# Patient Record
Sex: MALE | Race: WHITE | NOT HISPANIC OR LATINO | Employment: OTHER | URBAN - NONMETROPOLITAN AREA
[De-identification: names, ages, dates, MRNs, and addresses within clinical notes are randomized per-mention and may not be internally consistent; named-entity substitution may affect disease eponyms.]

---

## 2024-10-05 ENCOUNTER — HOSPITAL ENCOUNTER (EMERGENCY)
Facility: HOSPITAL | Age: 84
Discharge: OTHER INSTITUTION | End: 2024-10-06
Attending: EMERGENCY MEDICINE
Payer: MEDICARE

## 2024-10-05 ENCOUNTER — APPOINTMENT (OUTPATIENT)
Dept: RADIOLOGY | Facility: HOSPITAL | Age: 84
End: 2024-10-05
Payer: MEDICARE

## 2024-10-05 ENCOUNTER — APPOINTMENT (OUTPATIENT)
Dept: CARDIOLOGY | Facility: HOSPITAL | Age: 84
End: 2024-10-05
Payer: MEDICARE

## 2024-10-05 DIAGNOSIS — I50.9 ACUTE ON CHRONIC CONGESTIVE HEART FAILURE, UNSPECIFIED HEART FAILURE TYPE: Primary | ICD-10-CM

## 2024-10-05 DIAGNOSIS — R79.89 ELEVATED TROPONIN: ICD-10-CM

## 2024-10-05 DIAGNOSIS — N30.00 ACUTE CYSTITIS WITHOUT HEMATURIA: ICD-10-CM

## 2024-10-05 DIAGNOSIS — R53.1 GENERALIZED WEAKNESS: ICD-10-CM

## 2024-10-05 LAB
ALBUMIN SERPL BCP-MCNC: 3.6 G/DL (ref 3.4–5)
ALP SERPL-CCNC: 55 U/L (ref 33–136)
ALT SERPL W P-5'-P-CCNC: 20 U/L (ref 10–52)
ANION GAP SERPL CALC-SCNC: 15 MMOL/L (ref 10–20)
APPEARANCE UR: ABNORMAL
AST SERPL W P-5'-P-CCNC: 17 U/L (ref 9–39)
BASOPHILS # BLD AUTO: 0.04 X10*3/UL (ref 0–0.1)
BASOPHILS NFR BLD AUTO: 0.4 %
BILIRUB SERPL-MCNC: 0.8 MG/DL (ref 0–1.2)
BILIRUB UR STRIP.AUTO-MCNC: NEGATIVE MG/DL
BNP SERPL-MCNC: 394 PG/ML (ref 0–99)
BUN SERPL-MCNC: 28 MG/DL (ref 6–23)
CALCIUM SERPL-MCNC: 8.5 MG/DL (ref 8.6–10.3)
CARDIAC TROPONIN I PNL SERPL HS: 58 NG/L (ref 0–20)
CARDIAC TROPONIN I PNL SERPL HS: 63 NG/L (ref 0–20)
CHLORIDE SERPL-SCNC: 102 MMOL/L (ref 98–107)
CO2 SERPL-SCNC: 28 MMOL/L (ref 21–32)
COLOR UR: ABNORMAL
CREAT SERPL-MCNC: 1.34 MG/DL (ref 0.5–1.3)
EGFRCR SERPLBLD CKD-EPI 2021: 53 ML/MIN/1.73M*2
EOSINOPHIL # BLD AUTO: 0.05 X10*3/UL (ref 0–0.4)
EOSINOPHIL NFR BLD AUTO: 0.5 %
ERYTHROCYTE [DISTWIDTH] IN BLOOD BY AUTOMATED COUNT: 14.1 % (ref 11.5–14.5)
FLUAV RNA RESP QL NAA+PROBE: NOT DETECTED
FLUBV RNA RESP QL NAA+PROBE: NOT DETECTED
GLUCOSE SERPL-MCNC: 123 MG/DL (ref 74–99)
GLUCOSE UR STRIP.AUTO-MCNC: ABNORMAL MG/DL
HCT VFR BLD AUTO: 44.3 % (ref 41–52)
HGB BLD-MCNC: 14.2 G/DL (ref 13.5–17.5)
HOLD SPECIMEN: NORMAL
IMM GRANULOCYTES # BLD AUTO: 0.02 X10*3/UL (ref 0–0.5)
IMM GRANULOCYTES NFR BLD AUTO: 0.2 % (ref 0–0.9)
INR PPP: 1.8 (ref 0.9–1.1)
KETONES UR STRIP.AUTO-MCNC: NEGATIVE MG/DL
LACTATE SERPL-SCNC: 1.5 MMOL/L (ref 0.4–2)
LACTATE SERPL-SCNC: 3.7 MMOL/L (ref 0.4–2)
LEUKOCYTE ESTERASE UR QL STRIP.AUTO: ABNORMAL
LYMPHOCYTES # BLD AUTO: 1.48 X10*3/UL (ref 0.8–3)
LYMPHOCYTES NFR BLD AUTO: 15.5 %
MAGNESIUM SERPL-MCNC: 1.81 MG/DL (ref 1.6–2.4)
MCH RBC QN AUTO: 31 PG (ref 26–34)
MCHC RBC AUTO-ENTMCNC: 32.1 G/DL (ref 32–36)
MCV RBC AUTO: 97 FL (ref 80–100)
MONOCYTES # BLD AUTO: 1.05 X10*3/UL (ref 0.05–0.8)
MONOCYTES NFR BLD AUTO: 11 %
NEUTROPHILS # BLD AUTO: 6.88 X10*3/UL (ref 1.6–5.5)
NEUTROPHILS NFR BLD AUTO: 72.4 %
NITRITE UR QL STRIP.AUTO: NEGATIVE
NRBC BLD-RTO: 0 /100 WBCS (ref 0–0)
PH UR STRIP.AUTO: 5 [PH]
PLATELET # BLD AUTO: 182 X10*3/UL (ref 150–450)
POTASSIUM SERPL-SCNC: 4 MMOL/L (ref 3.5–5.3)
PROT SERPL-MCNC: 6.7 G/DL (ref 6.4–8.2)
PROT UR STRIP.AUTO-MCNC: ABNORMAL MG/DL
PROTHROMBIN TIME: 19.9 SECONDS (ref 9.8–12.8)
RBC # BLD AUTO: 4.58 X10*6/UL (ref 4.5–5.9)
RBC # UR STRIP.AUTO: ABNORMAL /UL
RBC #/AREA URNS AUTO: >20 /HPF
SARS-COV-2 RNA RESP QL NAA+PROBE: NOT DETECTED
SODIUM SERPL-SCNC: 141 MMOL/L (ref 136–145)
SP GR UR STRIP.AUTO: 1.02
SQUAMOUS #/AREA URNS AUTO: ABNORMAL /HPF
UROBILINOGEN UR STRIP.AUTO-MCNC: 4 MG/DL
WBC # BLD AUTO: 9.5 X10*3/UL (ref 4.4–11.3)
WBC #/AREA URNS AUTO: ABNORMAL /HPF

## 2024-10-05 PROCEDURE — 99285 EMERGENCY DEPT VISIT HI MDM: CPT | Mod: 25

## 2024-10-05 PROCEDURE — 70450 CT HEAD/BRAIN W/O DYE: CPT

## 2024-10-05 PROCEDURE — 36415 COLL VENOUS BLD VENIPUNCTURE: CPT | Performed by: EMERGENCY MEDICINE

## 2024-10-05 PROCEDURE — 85610 PROTHROMBIN TIME: CPT | Performed by: EMERGENCY MEDICINE

## 2024-10-05 PROCEDURE — 83880 ASSAY OF NATRIURETIC PEPTIDE: CPT | Performed by: EMERGENCY MEDICINE

## 2024-10-05 PROCEDURE — 96375 TX/PRO/DX INJ NEW DRUG ADDON: CPT

## 2024-10-05 PROCEDURE — 71045 X-RAY EXAM CHEST 1 VIEW: CPT

## 2024-10-05 PROCEDURE — 2500000004 HC RX 250 GENERAL PHARMACY W/ HCPCS (ALT 636 FOR OP/ED)

## 2024-10-05 PROCEDURE — 2500000001 HC RX 250 WO HCPCS SELF ADMINISTERED DRUGS (ALT 637 FOR MEDICARE OP): Performed by: EMERGENCY MEDICINE

## 2024-10-05 PROCEDURE — 2500000004 HC RX 250 GENERAL PHARMACY W/ HCPCS (ALT 636 FOR OP/ED): Performed by: EMERGENCY MEDICINE

## 2024-10-05 PROCEDURE — 87636 SARSCOV2 & INF A&B AMP PRB: CPT | Performed by: EMERGENCY MEDICINE

## 2024-10-05 PROCEDURE — 84484 ASSAY OF TROPONIN QUANT: CPT | Performed by: EMERGENCY MEDICINE

## 2024-10-05 PROCEDURE — 71045 X-RAY EXAM CHEST 1 VIEW: CPT | Performed by: RADIOLOGY

## 2024-10-05 PROCEDURE — 85025 COMPLETE CBC W/AUTO DIFF WBC: CPT | Performed by: EMERGENCY MEDICINE

## 2024-10-05 PROCEDURE — P9612 CATHETERIZE FOR URINE SPEC: HCPCS

## 2024-10-05 PROCEDURE — 93005 ELECTROCARDIOGRAM TRACING: CPT

## 2024-10-05 PROCEDURE — 87186 SC STD MICRODIL/AGAR DIL: CPT | Mod: CONLAB | Performed by: EMERGENCY MEDICINE

## 2024-10-05 PROCEDURE — 81001 URINALYSIS AUTO W/SCOPE: CPT | Performed by: EMERGENCY MEDICINE

## 2024-10-05 PROCEDURE — 70450 CT HEAD/BRAIN W/O DYE: CPT | Performed by: RADIOLOGY

## 2024-10-05 PROCEDURE — 80053 COMPREHEN METABOLIC PANEL: CPT | Performed by: EMERGENCY MEDICINE

## 2024-10-05 PROCEDURE — 96365 THER/PROPH/DIAG IV INF INIT: CPT

## 2024-10-05 PROCEDURE — 83605 ASSAY OF LACTIC ACID: CPT | Performed by: EMERGENCY MEDICINE

## 2024-10-05 PROCEDURE — 83735 ASSAY OF MAGNESIUM: CPT | Performed by: EMERGENCY MEDICINE

## 2024-10-05 RX ORDER — PREGABALIN 150 MG/1
1 CAPSULE ORAL 3 TIMES DAILY
COMMUNITY
Start: 2019-06-05

## 2024-10-05 RX ORDER — NAPROXEN SODIUM 220 MG/1
81 TABLET, FILM COATED ORAL ONCE
Status: COMPLETED | OUTPATIENT
Start: 2024-10-05 | End: 2024-10-05

## 2024-10-05 RX ORDER — APIXABAN 5 MG/1
1 TABLET, FILM COATED ORAL 2 TIMES DAILY
COMMUNITY
Start: 2024-05-29

## 2024-10-05 RX ORDER — AMIODARONE HYDROCHLORIDE 200 MG/1
1 TABLET ORAL DAILY
COMMUNITY
Start: 2024-08-19

## 2024-10-05 RX ORDER — FLUTICASONE PROPIONATE 50 MCG
2 SPRAY, SUSPENSION (ML) NASAL DAILY
COMMUNITY
Start: 2024-05-29

## 2024-10-05 RX ORDER — FAMOTIDINE 20 MG/1
20 TABLET, FILM COATED ORAL DAILY
COMMUNITY

## 2024-10-05 RX ORDER — FERROUS SULFATE 325(65) MG
325 TABLET ORAL
COMMUNITY

## 2024-10-05 RX ORDER — SPIRONOLACTONE 25 MG/1
0.5 TABLET ORAL DAILY
COMMUNITY
Start: 2023-08-07

## 2024-10-05 RX ORDER — LORAZEPAM 0.5 MG/1
0.5 TABLET ORAL ONCE
Status: DISCONTINUED | OUTPATIENT
Start: 2024-10-05 | End: 2024-10-06 | Stop reason: HOSPADM

## 2024-10-05 RX ORDER — NAPROXEN SODIUM 220 MG/1
81 TABLET, FILM COATED ORAL DAILY
COMMUNITY
Start: 2019-06-05

## 2024-10-05 RX ORDER — SACUBITRIL AND VALSARTAN 49; 51 MG/1; MG/1
1 TABLET, FILM COATED ORAL 2 TIMES DAILY
COMMUNITY
Start: 2024-08-19

## 2024-10-05 RX ORDER — METOPROLOL SUCCINATE 50 MG/1
1 TABLET, EXTENDED RELEASE ORAL 2 TIMES DAILY
COMMUNITY
Start: 2024-08-19

## 2024-10-05 RX ORDER — METHYLPREDNISOLONE 4 MG
1 TABLET, DOSE PACK ORAL ONCE
COMMUNITY

## 2024-10-05 RX ORDER — PAROXETINE HYDROCHLORIDE 20 MG/1
1 TABLET, FILM COATED ORAL DAILY
COMMUNITY
Start: 2024-05-29

## 2024-10-05 RX ORDER — TRAMADOL HYDROCHLORIDE 50 MG/1
50 TABLET ORAL EVERY 6 HOURS PRN
COMMUNITY
Start: 2024-05-30

## 2024-10-05 RX ORDER — CEFTRIAXONE 2 G/50ML
2 INJECTION, SOLUTION INTRAVENOUS ONCE
Status: COMPLETED | OUTPATIENT
Start: 2024-10-05 | End: 2024-10-05

## 2024-10-05 RX ORDER — ALLOPURINOL 100 MG/1
2 TABLET ORAL DAILY
COMMUNITY
Start: 2024-04-11

## 2024-10-05 RX ORDER — FUROSEMIDE 10 MG/ML
40 INJECTION INTRAMUSCULAR; INTRAVENOUS ONCE
Status: COMPLETED | OUTPATIENT
Start: 2024-10-05 | End: 2024-10-05

## 2024-10-05 RX ORDER — OMEPRAZOLE 40 MG/1
40 CAPSULE, DELAYED RELEASE ORAL
COMMUNITY
Start: 2024-10-02

## 2024-10-05 RX ORDER — NITROGLYCERIN 0.4 MG/1
0.4 TABLET SUBLINGUAL EVERY 5 MIN PRN
COMMUNITY
Start: 2024-06-25

## 2024-10-05 RX ORDER — COLCHICINE 0.6 MG/1
1 TABLET ORAL 2 TIMES DAILY
COMMUNITY
Start: 2023-08-10

## 2024-10-05 RX ORDER — BUPROPION HYDROCHLORIDE 150 MG/1
1 TABLET ORAL DAILY
COMMUNITY
Start: 2019-06-05

## 2024-10-05 RX ORDER — ROSUVASTATIN CALCIUM 20 MG/1
1 TABLET, COATED ORAL DAILY
COMMUNITY
Start: 2019-06-05

## 2024-10-05 RX ORDER — POTASSIUM CHLORIDE 750 MG/1
10 TABLET, FILM COATED, EXTENDED RELEASE ORAL 2 TIMES DAILY
COMMUNITY

## 2024-10-05 RX ORDER — CEFTRIAXONE 2 G/50ML
INJECTION, SOLUTION INTRAVENOUS
Status: COMPLETED
Start: 2024-10-05 | End: 2024-10-05

## 2024-10-05 ASSESSMENT — PAIN DESCRIPTION - LOCATION: LOCATION: BACK

## 2024-10-05 ASSESSMENT — PAIN DESCRIPTION - DESCRIPTORS: DESCRIPTORS: ACHING

## 2024-10-05 ASSESSMENT — PAIN DESCRIPTION - PAIN TYPE: TYPE: ACUTE PAIN

## 2024-10-05 ASSESSMENT — COLUMBIA-SUICIDE SEVERITY RATING SCALE - C-SSRS
6. HAVE YOU EVER DONE ANYTHING, STARTED TO DO ANYTHING, OR PREPARED TO DO ANYTHING TO END YOUR LIFE?: NO
1. IN THE PAST MONTH, HAVE YOU WISHED YOU WERE DEAD OR WISHED YOU COULD GO TO SLEEP AND NOT WAKE UP?: NO
2. HAVE YOU ACTUALLY HAD ANY THOUGHTS OF KILLING YOURSELF?: NO

## 2024-10-05 ASSESSMENT — PAIN - FUNCTIONAL ASSESSMENT: PAIN_FUNCTIONAL_ASSESSMENT: 0-10

## 2024-10-05 ASSESSMENT — PAIN SCALES - GENERAL
PAINLEVEL_OUTOF10: 0 - NO PAIN
PAINLEVEL_OUTOF10: 7

## 2024-10-05 NOTE — ED TRIAGE NOTES
Presented with c/o SOB and fall. Began getting SOB yesterday. Pt had a fall on Thursday at a urologist office in [place] while getting off of exam table after having a winslow catheter removed. Pt sent to [facility] and worked up for the fall. All results were negative. Pt had urolift procedure recently and winslow catheter placed twice since. Pt is having some incontinence when not being able to get to bathroom in time.

## 2024-10-05 NOTE — ED PROVIDER NOTES
Department of Emergency Medicine   ED  Provider Note  Admit Date/RoomTime: 10/5/2024  2:31 PM  ED Room: RAQUEL/RAQUEL                  History of Present Illness:   Jonny Vicente is a 83 y.o. male presenting to the ED for cough, sob, fever, beginning over last 2 days.  The complaint has been persistent, moderate in severity, and worsened by  walking/laying flat .  Cough minimally productive of phlegm.  Low grade fever today.  States he fell his doctor's office 2 days ago.  He was seen at [facility] ED and had multiple CTs and x-rays all of which were negative.  He is on Eliquis.  No known ill contacts.  He has had some leg swelling.  Shortness of breath is worse with walking and laying flat.      Review of Systems:   Pertinent positives and review of systems as noted above.  Remaining 10 review of systems is negative or noncontributory to today's episode of care.  Review of Systems   A complete review of systems is otherwise negative except as noted above    --------------------------------------------- PAST HISTORY ---------------------------------------------  Past Medical History:  has no past medical history on file.  Please see EMR    Past Surgical History:  has no past surgical history on file.    Social History:  reports that he has quit smoking. His smoking use included cigarettes. He has never used smokeless tobacco. He reports that he does not drink alcohol and does not use drugs.    Family History: family history is not on file. Unless otherwise noted, family history is non contributory    Discharge Medication List as of 10/6/2024 10:36 AM        CONTINUE these medications which have NOT CHANGED    Details   allopurinol (Zyloprim) 100 mg tablet Take 2 tablets (200 mg) by mouth once daily., Starting Thu 4/11/2024, Historical Med      amiodarone (Pacerone) 200 mg tablet Take 1 tablet (200 mg) by mouth once daily., Starting Mon 8/19/2024, Historical Med      aspirin 81 mg chewable tablet Chew 1 tablet (81 mg) once  daily., Starting Wed 6/5/2019, Historical Med      buPROPion XL (Wellbutrin XL) 150 mg 24 hr tablet Take 1 tablet (150 mg) by mouth once daily., Starting Wed 6/5/2019, Historical Med      colchicine 0.6 mg tablet Take 1 tablet (0.6 mg) by mouth 2 times a day., Starting Thu 8/10/2023, Historical Med      Eliquis 5 mg tablet Take 1 tablet (5 mg) by mouth 2 times a day., Starting Wed 5/29/2024, Historical Med      empagliflozin (Jardiance) 25 mg Take 1 tablet (25 mg) by mouth once daily., Starting Tue 6/25/2024, Historical Med      Entresto 49-51 mg tablet Take 1 tablet by mouth 2 times a day., Starting Mon 8/19/2024, Historical Med      fluticasone (Flonase) 50 mcg/actuation nasal spray Administer 2 sprays into affected nostril(s) once daily., Starting Wed 5/29/2024, Historical Med      ipratropium-albuteroL (Combivent Respimat)  mcg/actuation inhaler Inhale 1 puff 4 times a day., Starting Wed 6/5/2019, Historical Med      metoprolol succinate XL (Toprol-XL) 50 mg 24 hr tablet Take 1 tablet (50 mg) by mouth 2 times a day., Starting Mon 8/19/2024, Historical Med      nitroglycerin (Nitrostat) 0.4 mg SL tablet Take 1 tablet (0.4 mg) by mouth every 5 minutes if needed., Starting Tue 6/25/2024, Historical Med      omeprazole (PriLOSEC) 40 mg DR capsule Take 1 capsule (40 mg) by mouth once daily in the morning. Take before meals., Starting Wed 10/2/2024, Historical Med      PARoxetine (Paxil) 20 mg tablet Take 1 tablet (20 mg) by mouth once daily., Starting Wed 5/29/2024, Historical Med      pregabalin (Lyrica) 150 mg capsule Take 1 capsule (150 mg) by mouth 3 times a day., Starting Wed 6/5/2019, Historical Med      rosuvastatin (Crestor) 20 mg tablet Take 1 tablet (20 mg) by mouth once daily., Starting Wed 6/5/2019, Historical Med      spironolactone (Aldactone) 25 mg tablet Take 0.5 tablets (12.5 mg) by mouth once daily., Starting Mon 8/7/2023, Historical Med      traMADol (Ultram) 50 mg tablet Take 1 tablet (50  mg) by mouth every 6 hours if needed for moderate pain (4 - 6)., Starting Thu 5/30/2024, Historical Med      calcium carb-vitamin D3-vit K2 600 mg-1,000 unit-90 mcg tablet Take 1 tablet by mouth once daily., Historical Med      famotidine (Pepcid) 20 mg tablet Take 1 tablet (20 mg) by mouth once daily., Historical Med      ferrous sulfate, 325 mg ferrous sulfate, tablet Take 1 tablet (325 mg) by mouth once daily., Historical Med      glucosamine sulfate 500 mg tablet Take 1 tablet by mouth 1 time., Historical Med      potassium chloride CR 10 mEq ER tablet Take 1 tablet (10 mEq) by mouth 2 times a day., Historical Med            The patient’s home medications have been reviewed.    Allergies: Hydrocodone and Oxycodone    -------------------------------------------------- RESULTS -------------------------------------------------  All laboratory and radiology results have been personally reviewed by myself   LABS:  Labs Reviewed   CBC WITH AUTO DIFFERENTIAL - Abnormal       Result Value    WBC 9.5      nRBC 0.0      RBC 4.58      Hemoglobin 14.2      Hematocrit 44.3      MCV 97      MCH 31.0      MCHC 32.1      RDW 14.1      Platelets 182      Neutrophils % 72.4      Immature Granulocytes %, Automated 0.2      Lymphocytes % 15.5      Monocytes % 11.0      Eosinophils % 0.5      Basophils % 0.4      Neutrophils Absolute 6.88 (*)     Immature Granulocytes Absolute, Automated 0.02      Lymphocytes Absolute 1.48      Monocytes Absolute 1.05 (*)     Eosinophils Absolute 0.05      Basophils Absolute 0.04     COMPREHENSIVE METABOLIC PANEL - Abnormal    Glucose 123 (*)     Sodium 141      Potassium 4.0      Chloride 102      Bicarbonate 28      Anion Gap 15      Urea Nitrogen 28 (*)     Creatinine 1.34 (*)     eGFR 53 (*)     Calcium 8.5 (*)     Albumin 3.6      Alkaline Phosphatase 55      Total Protein 6.7      AST 17      Bilirubin, Total 0.8      ALT 20     PROTIME-INR - Abnormal    Protime 19.9 (*)     INR 1.8 (*)     B-TYPE NATRIURETIC PEPTIDE - Abnormal     (*)     Narrative:        <100 pg/mL - Heart failure unlikely  100-299 pg/mL - Intermediate probability of acute heart                  failure exacerbation. Correlate with clinical                  context and patient history.    >=300 pg/mL - Heart Failure likely. Correlate with clinical                  context and patient history.    BNP testing is performed using different testing methodology at [facility] than at other [facility]. Direct result comparisons should only be made within the same method.      LACTATE - Abnormal    Lactate 3.7 (*)     Narrative:     Venipuncture immediately after or during the administration of Metamizole may lead to falsely low results. Testing should be performed immediately prior to Metamizole dosing.   SERIAL TROPONIN-INITIAL - Abnormal    Troponin I, High Sensitivity 63 (*)     Narrative:     Less than 99th percentile of normal range cutoff-  Female and children under 18 years old <14 ng/L; Male <21 ng/L: Negative  Repeat testing should be performed if clinically indicated.     Female and children under 18 years old 14-50 ng/L; Male 21-50 ng/L:  Consistent with possible cardiac damage and possible increased clinical   risk. Serial measurements may help to assess extent of myocardial damage.     >50 ng/L: Consistent with cardiac damage, increased clinical risk and  myocardial infarction. Serial measurements may help assess extent of   myocardial damage.      NOTE: Children less than 1 year old may have higher baseline troponin   levels and results should be interpreted in conjunction with the overall   clinical context.     NOTE: Troponin I testing is performed using a different   testing methodology at [facility] than at other   [facility]. Direct result comparisons should only   be made within the same method.   SERIAL TROPONIN, 1 HOUR - Abnormal    Troponin I, High Sensitivity 58 (*)      Narrative:     Less than 99th percentile of normal range cutoff-  Female and children under 18 years old <14 ng/L; Male <21 ng/L: Negative  Repeat testing should be performed if clinically indicated.     Female and children under 18 years old 14-50 ng/L; Male 21-50 ng/L:  Consistent with possible cardiac damage and possible increased clinical   risk. Serial measurements may help to assess extent of myocardial damage.     >50 ng/L: Consistent with cardiac damage, increased clinical risk and  myocardial infarction. Serial measurements may help assess extent of   myocardial damage.      NOTE: Children less than 1 year old may have higher baseline troponin   levels and results should be interpreted in conjunction with the overall   clinical context.     NOTE: Troponin I testing is performed using a different   testing methodology at [facility] than at other   [facility]. Direct result comparisons should only   be made within the same method.   URINALYSIS WITH REFLEX CULTURE AND MICROSCOPIC - Abnormal    Color, Urine Isidra (*)     Appearance, Urine Hazy (*)     Specific Gravity, Urine 1.017      pH, Urine 5.0      Protein, Urine 30 (1+) (*)     Glucose, Urine >=500 (3+) (*)     Blood, Urine LARGE (3+) (*)     Ketones, Urine NEGATIVE      Bilirubin, Urine NEGATIVE      Urobilinogen, Urine 4.0 (*)     Nitrite, Urine NEGATIVE      Leukocyte Esterase, Urine SMALL (1+) (*)    MICROSCOPIC ONLY, URINE - Abnormal    WBC, Urine 11-20 (*)     RBC, Urine >20 (*)     Squamous Epithelial Cells, Urine 1-9 (SPARSE)     CBC WITH AUTO DIFFERENTIAL - Abnormal    WBC 8.9      nRBC 0.0      RBC 4.21 (*)     Hemoglobin 12.9 (*)     Hematocrit 40.0 (*)     MCV 95      MCH 30.6      MCHC 32.3      RDW 14.0      Platelets 175      Neutrophils % 72.5      Immature Granulocytes %, Automated 0.3      Lymphocytes % 15.4      Monocytes % 9.9      Eosinophils % 1.5      Basophils % 0.4      Neutrophils Absolute 6.47 (*)      Immature Granulocytes Absolute, Automated 0.03      Lymphocytes Absolute 1.37      Monocytes Absolute 0.88 (*)     Eosinophils Absolute 0.13      Basophils Absolute 0.04     BASIC METABOLIC PANEL - Abnormal    Glucose 94      Sodium 137      Potassium 3.2 (*)     Chloride 99      Bicarbonate 30      Anion Gap 11      Urea Nitrogen 31 (*)     Creatinine 1.22      eGFR 60 (*)     Calcium 8.2 (*)    TROPONIN I, HIGH SENSITIVITY - Abnormal    Troponin I, High Sensitivity 44 (*)     Narrative:     Less than 99th percentile of normal range cutoff-  Female and children under 18 years old <14 ng/L; Male <21 ng/L: Negative  Repeat testing should be performed if clinically indicated.     Female and children under 18 years old 14-50 ng/L; Male 21-50 ng/L:  Consistent with possible cardiac damage and possible increased clinical   risk. Serial measurements may help to assess extent of myocardial damage.     >50 ng/L: Consistent with cardiac damage, increased clinical risk and  myocardial infarction. Serial measurements may help assess extent of   myocardial damage.      NOTE: Children less than 1 year old may have higher baseline troponin   levels and results should be interpreted in conjunction with the overall   clinical context.     NOTE: Troponin I testing is performed using a different   testing methodology at [facility] than at other   [facility]. Direct result comparisons should only   be made within the same method.   B-TYPE NATRIURETIC PEPTIDE - Abnormal     (*)     Narrative:        <100 pg/mL - Heart failure unlikely  100-299 pg/mL - Intermediate probability of acute heart                  failure exacerbation. Correlate with clinical                  context and patient history.    >=300 pg/mL - Heart Failure likely. Correlate with clinical                  context and patient history.    BNP testing is performed using different testing methodology at [facility] than at other  [facility]. Direct result comparisons should only be made within the same method.      MAGNESIUM - Normal    Magnesium 1.81     SARS-COV-2 PCR - Normal    Coronavirus 2019, PCR Not Detected      Narrative:     This assay has received FDA Emergency Use Authorization (EUA) and is only authorized for the duration of time that circumstances exist to justify the authorization of the emergency use of in vitro diagnostic tests for the detection of SARS-CoV-2 virus and/or diagnosis of COVID-19 infection under section 564(b)(1) of the Act, 21 U.S.C. 360bbb-3(b)(1). This assay is an in vitro diagnostic nucleic acid amplification test for the qualitative detection of SARS-CoV-2 from nasopharyngeal specimens and has been validated for use at [facility]. Negative results do not preclude COVID-19 infections and should not be used as the sole basis for diagnosis, treatment, or other management decisions.     INFLUENZA A AND B PCR - Normal    Flu A Result Not Detected      Flu B Result Not Detected      Narrative:     This assay is an in vitro diagnostic multiplex nucleic acid amplification test for the detection and discrimination of Influenza A & B from nasopharyngeal specimens, and has been validated for use at [facility]. Negative results do not preclude Influenza A/B infections, and should not be used as the sole basis for diagnosis, treatment, or other management decisions. If Influenza A/B and RSV PCR results are negative, testing for Parainfluenza virus, Adenovirus and Metapneumovirus is routinely performed for [facility] pediatric oncology and intensive care inpatients, and is available on other patients by placing an add-on request.   LACTATE - Normal    Lactate 1.5      Narrative:     Venipuncture immediately after or during the administration of Metamizole may lead to falsely low results. Testing should be performed immediately prior to Metamizole dosing.   URINE CULTURE    TROPONIN SERIES- (INITIAL, 1 HR)    Narrative:     The following orders were created for panel order Troponin I Series, High Sensitivity (0, 1 HR).  Procedure                               Abnormality         Status                     ---------                               -----------         ------                     Troponin I, High Sensiti...[[id number]]  Abnormal            Final result               Troponin, High Sensitivi...[[id number]]  Abnormal            Final result                 Please view results for these tests on the individual orders.   URINALYSIS WITH REFLEX CULTURE AND MICROSCOPIC    Narrative:     The following orders were created for panel order Urinalysis with Reflex Culture and Microscopic.  Procedure                               Abnormality         Status                     ---------                               -----------         ------                     Urinalysis with Reflex C...[[id number]]  Abnormal            Final result               Extra Urine Gray Tube[[id number]]                            Final result                 Please view results for these tests on the individual orders.   EXTRA URINE GRAY TUBE    Extra Tube Hold for add-ons.           RADIOLOGY:  Interpreted by Radiologist.  CT head wo IV contrast   Final Result   No acute intracranial hemorrhage or large territory infarction is   evident.        Signed by: Bill Feliciano 10/5/2024 3:54 PM   Dictation workstation:   [id number]      XR chest 1 view   Final Result   No acute cardiopulmonary process is evident.        MACRO:   None        Signed by: Bill Feliciano 10/5/2024 3:00 PM   Dictation workstation:   [id number]          No results found for this or any previous visit (from the past 4464 hour(s)).  ------------------------- NURSING NOTES AND VITALS REVIEWED ---------------------------   The nursing notes within the ED encounter and vital signs as below have been reviewed.   /78   Pulse 74   Temp  "36.3 °C (97.4 °F)   Resp 18   Ht 1.778 m (5' 10\")   Wt (!) 157 kg (346 lb 2 oz)   SpO2 98%   BMI 49.66 kg/m²   Oxygen Saturation Interpretation: Normal      ---------------------------------------------------PHYSICAL EXAM--------------------------------------  Physical Exam  Vitals and nursing note reviewed.   Constitutional:       General: He is not in acute distress.     Appearance: He is well-developed.   HENT:      Head: Normocephalic and atraumatic.      Mouth/Throat:      Mouth: Mucous membranes are moist.      Pharynx: Oropharynx is clear.   Eyes:      Extraocular Movements: Extraocular movements intact.      Conjunctiva/sclera: Conjunctivae normal.      Pupils: Pupils are equal, round, and reactive to light.   Neck:      Thyroid: No thyromegaly.      Vascular: No JVD.   Cardiovascular:      Rate and Rhythm: Normal rate and regular rhythm.      Pulses: Normal pulses.      Heart sounds: Normal heart sounds. No murmur heard.  Pulmonary:      Effort: Pulmonary effort is normal. No respiratory distress.      Breath sounds: Examination of the right-lower field reveals rales. Examination of the left-lower field reveals rales. Decreased breath sounds and rales present. No wheezing or rhonchi.   Chest:      Chest wall: No mass, deformity, tenderness, crepitus or edema. There is no dullness to percussion.   Abdominal:      Palpations: Abdomen is soft. There is no hepatomegaly or mass.      Tenderness: There is no abdominal tenderness. There is no guarding or rebound.   Musculoskeletal:         General: No swelling. Normal range of motion.      Cervical back: Normal range of motion and neck supple.      Right lower leg: No tenderness. Edema present.      Left lower leg: No tenderness. Edema present.   Lymphadenopathy:      Cervical: No cervical adenopathy.   Skin:     General: Skin is warm and dry.      Capillary Refill: Capillary refill takes less than 2 seconds.      Coloration: Skin is not cyanotic.      " Findings: No rash.      Nails: There is no clubbing.   Neurological:      Mental Status: He is alert and oriented to person, place, and time.   Psychiatric:         Mood and Affect: Mood normal.            Procedures  NONE  ------------------------------ ED COURSE/MEDICAL DECISION MAKING----------------------    Medical Decision Making:   Patient was seen and examined by me.  An IV is started.  Multiple labs ordered.  Chest x-ray imaging ordered.  Viral testing ordered.    ED Course as of 10/06/24 1712   Sat Oct 05, 2024   1526 White blood cell count is 9.5, hemoglobin 14.2, hematocrit 44.3, platelet count 118,000, no significant shift [EC]   1526 EKG at 1437 reveals a dual-chamber paced rhythm.  Rate 64 bpm.  Left axis deviation.  No acute ST segment elevation or depression.  No STEMI. [EC]   1528 Comprehensive metabolic panel(!)  Sodium 141, potassium 4.0, chloride 102, bicarb 28, anion gap 15, glucose 123    BUN 28, creatinine 1.34, GFR 53, calcium 8.5  LFTs are normal [EC]   1529 Protime-INR(!)  INR is 1.8.  The patient is on Eliquis [EC]   1529 Lactate(!)  Lactate is 3.7, I do not think the patient is septic.  He has history of CHF. [EC]   1529 CXR:  FINDINGS:  The lungs are clear.  No pneumothorax or effusion is evident. The  cardiomediastinal silhouette is  enlarged but similar to the prior  exam.There is a pulse generator on the left chest wall with leads  tracking over the heart.Degenerative change is seen of the spine and  shoulders.      IMPRESSION:  No acute cardiopulmonary process is evident.  Cardiomegally. [EC]   1736 CBC and Auto Differential(!)  CBC is normal [EC]   1737 Troponin I Series, High Sensitivity (0, 1 HR)(!!)  First troponin 63, second troponin 58 [EC]   1737 Lactate  Lactate 1.5 [EC]   1738 B-Type Natriuretic Peptide(!)   [EC]   1739 CT Head:  IMPRESSION:  No acute intracranial hemorrhage or large territory infarction is  evident.   [EC]   1740 Coronavirus is not  detected  Influenza A/B is not detected/not detected [EC]   2016 Patient was given aspirin 81 mg.  Lasix 40 mg IV was given.  For CHF  Rocephin 1 g IV was given for UTI    I discussed the findings with the patient and his wife.  I do feel that with his elevated troponins he does require admission.  They requested transfer to [facility] in [facility].    I did discuss the case with Dr. Shanda Aguiar who accepted the patient in transfer there.   [EC]   2134 Patient assessed, chart reviewed [MN]   Sun Oct 06, 2024   0846 Repeat EKG 10/6/2024 at 0818 interpreted by me at 0821.  Atrial paced rhythm.  Rate 64 bpm.  No acute ST-T change. [EC]   1024 White count this morning 8.9, hemoglobin 12.9, hematocrit 40.0, no significant shift [EC]   1025 Troponin I, High Sensitivity(!)  Repeat troponin trending down 44 [EC]   1025 Basic metabolic panel(!)  Potassium low 3.2 [EC]   1026 EMS was here to  the patient and take him to [facility] [EC]      ED Course User Index  [EC] Jose Venegas DO  [MN] Snow Castillo MD         Diagnoses as of 10/06/24 1712   Acute on chronic congestive heart failure, unspecified heart failure type   Acute cystitis without hematuria   Elevated troponin   Generalized weakness      Counseling:   The emergency provider has spoken with the patient and wife  and discussed today’s results, in addition to providing specific details for the plan of care and counseling regarding the diagnosis and prognosis.  Questions are answered at this time and they are agreeable with the plan.      --------------------------------- IMPRESSION AND DISPOSITION ---------------------------------        IMPRESSION  1. Acute on chronic congestive heart failure, unspecified heart failure type    2. Acute cystitis without hematuria    3. Elevated troponin    4. Generalized weakness        DISPOSITION  Disposition: Transfer to [facility] to Dr. Shanda Aguiar  Patient condition is stable      Billing Provider  Critical Care Time: 0 minutes     Jose Venegas,   10/05/24 2020       Jose Venegas,   10/05/24 2254       Jose Venegas,   10/06/24 171

## 2024-10-06 ENCOUNTER — APPOINTMENT (OUTPATIENT)
Dept: CARDIOLOGY | Facility: HOSPITAL | Age: 84
End: 2024-10-06
Payer: MEDICARE

## 2024-10-06 VITALS
WEIGHT: 315 LBS | OXYGEN SATURATION: 98 % | HEIGHT: 70 IN | SYSTOLIC BLOOD PRESSURE: 132 MMHG | HEART RATE: 74 BPM | TEMPERATURE: 97.4 F | DIASTOLIC BLOOD PRESSURE: 78 MMHG | BODY MASS INDEX: 45.1 KG/M2 | RESPIRATION RATE: 18 BRPM

## 2024-10-06 LAB
ANION GAP SERPL CALC-SCNC: 11 MMOL/L (ref 10–20)
BASOPHILS # BLD AUTO: 0.04 X10*3/UL (ref 0–0.1)
BASOPHILS NFR BLD AUTO: 0.4 %
BNP SERPL-MCNC: 196 PG/ML (ref 0–99)
BUN SERPL-MCNC: 31 MG/DL (ref 6–23)
CALCIUM SERPL-MCNC: 8.2 MG/DL (ref 8.6–10.3)
CARDIAC TROPONIN I PNL SERPL HS: 44 NG/L (ref 0–20)
CHLORIDE SERPL-SCNC: 99 MMOL/L (ref 98–107)
CO2 SERPL-SCNC: 30 MMOL/L (ref 21–32)
CREAT SERPL-MCNC: 1.22 MG/DL (ref 0.5–1.3)
EGFRCR SERPLBLD CKD-EPI 2021: 60 ML/MIN/1.73M*2
EOSINOPHIL # BLD AUTO: 0.13 X10*3/UL (ref 0–0.4)
EOSINOPHIL NFR BLD AUTO: 1.5 %
ERYTHROCYTE [DISTWIDTH] IN BLOOD BY AUTOMATED COUNT: 14 % (ref 11.5–14.5)
GLUCOSE SERPL-MCNC: 94 MG/DL (ref 74–99)
HCT VFR BLD AUTO: 40 % (ref 41–52)
HGB BLD-MCNC: 12.9 G/DL (ref 13.5–17.5)
HOLD SPECIMEN: NORMAL
IMM GRANULOCYTES # BLD AUTO: 0.03 X10*3/UL (ref 0–0.5)
IMM GRANULOCYTES NFR BLD AUTO: 0.3 % (ref 0–0.9)
LYMPHOCYTES # BLD AUTO: 1.37 X10*3/UL (ref 0.8–3)
LYMPHOCYTES NFR BLD AUTO: 15.4 %
MCH RBC QN AUTO: 30.6 PG (ref 26–34)
MCHC RBC AUTO-ENTMCNC: 32.3 G/DL (ref 32–36)
MCV RBC AUTO: 95 FL (ref 80–100)
MONOCYTES # BLD AUTO: 0.88 X10*3/UL (ref 0.05–0.8)
MONOCYTES NFR BLD AUTO: 9.9 %
NEUTROPHILS # BLD AUTO: 6.47 X10*3/UL (ref 1.6–5.5)
NEUTROPHILS NFR BLD AUTO: 72.5 %
NRBC BLD-RTO: 0 /100 WBCS (ref 0–0)
PLATELET # BLD AUTO: 175 X10*3/UL (ref 150–450)
POTASSIUM SERPL-SCNC: 3.2 MMOL/L (ref 3.5–5.3)
RBC # BLD AUTO: 4.21 X10*6/UL (ref 4.5–5.9)
SODIUM SERPL-SCNC: 137 MMOL/L (ref 136–145)
WBC # BLD AUTO: 8.9 X10*3/UL (ref 4.4–11.3)

## 2024-10-06 PROCEDURE — 80048 BASIC METABOLIC PNL TOTAL CA: CPT | Performed by: EMERGENCY MEDICINE

## 2024-10-06 PROCEDURE — 84484 ASSAY OF TROPONIN QUANT: CPT | Performed by: EMERGENCY MEDICINE

## 2024-10-06 PROCEDURE — 83880 ASSAY OF NATRIURETIC PEPTIDE: CPT | Performed by: EMERGENCY MEDICINE

## 2024-10-06 PROCEDURE — 93005 ELECTROCARDIOGRAM TRACING: CPT

## 2024-10-06 PROCEDURE — 36415 COLL VENOUS BLD VENIPUNCTURE: CPT | Performed by: EMERGENCY MEDICINE

## 2024-10-06 PROCEDURE — 85025 COMPLETE CBC W/AUTO DIFF WBC: CPT | Performed by: EMERGENCY MEDICINE

## 2024-10-06 RX ORDER — FERROUS SULFATE 325(65) MG
65 TABLET ORAL
Status: DISCONTINUED | OUTPATIENT
Start: 2024-10-06 | End: 2024-10-06 | Stop reason: HOSPADM

## 2024-10-06 RX ORDER — METOPROLOL SUCCINATE 25 MG/1
50 TABLET, EXTENDED RELEASE ORAL 2 TIMES DAILY
Status: DISCONTINUED | OUTPATIENT
Start: 2024-10-06 | End: 2024-10-06 | Stop reason: HOSPADM

## 2024-10-06 RX ORDER — SPIRONOLACTONE 25 MG/1
25 TABLET ORAL DAILY
Status: DISCONTINUED | OUTPATIENT
Start: 2024-10-06 | End: 2024-10-06 | Stop reason: HOSPADM

## 2024-10-06 RX ORDER — FAMOTIDINE 20 MG/1
20 TABLET, FILM COATED ORAL ONCE
Status: DISCONTINUED | OUTPATIENT
Start: 2024-10-06 | End: 2024-10-06 | Stop reason: HOSPADM

## 2024-10-06 RX ORDER — IPRATROPIUM BROMIDE AND ALBUTEROL SULFATE 2.5; .5 MG/3ML; MG/3ML
3 SOLUTION RESPIRATORY (INHALATION)
Status: DISCONTINUED | OUTPATIENT
Start: 2024-10-06 | End: 2024-10-06 | Stop reason: HOSPADM

## 2024-10-06 RX ORDER — PREGABALIN 75 MG/1
150 CAPSULE ORAL 2 TIMES DAILY
Status: DISCONTINUED | OUTPATIENT
Start: 2024-10-06 | End: 2024-10-06 | Stop reason: HOSPADM

## 2024-10-06 RX ORDER — BUPROPION HYDROCHLORIDE 150 MG/1
150 TABLET ORAL DAILY
Status: DISCONTINUED | OUTPATIENT
Start: 2024-10-06 | End: 2024-10-06 | Stop reason: HOSPADM

## 2024-10-06 RX ORDER — NAPROXEN SODIUM 220 MG/1
81 TABLET, FILM COATED ORAL ONCE
Status: DISCONTINUED | OUTPATIENT
Start: 2024-10-06 | End: 2024-10-06 | Stop reason: HOSPADM

## 2024-10-06 RX ORDER — AMIODARONE HYDROCHLORIDE 200 MG/1
200 TABLET ORAL DAILY
Status: DISCONTINUED | OUTPATIENT
Start: 2024-10-06 | End: 2024-10-06 | Stop reason: HOSPADM

## 2024-10-06 NOTE — ED NOTES
ACCEPTED AT [facility] AWAITING BED ASSIGNMENT.     Arnold Bright RN  10/05/24 2040

## 2024-10-06 NOTE — PROGRESS NOTES
Emergency Medicine Transition of Care Note.    I received Jonny Vicente in signout from Dr. SKYLER Castillo.  Please see the previous ED provider note for all HPI, PE and MDM up to the time of signout at 0800. This is in addition to the primary record.    In brief Jonny Vicente is an 83 y.o. male presenting for   Chief Complaint   Patient presents with    Shortness of Breath     SOB since yesterday     At the time of signout we were awaiting: transfer to [facility] in [place], [place]        ED Course as of 10/06/24 1027   Sat Oct 05, 2024   1526 White blood cell count is 9.5, hemoglobin 14.2, hematocrit 44.3, platelet count 118,000, no significant shift [EC]   1526 EKG at 1437 reveals a dual-chamber paced rhythm.  Rate 64 bpm.  Left axis deviation.  No acute ST segment elevation or depression.  No STEMI. [EC]   1528 Comprehensive metabolic panel(!)  Sodium 141, potassium 4.0, chloride 102, bicarb 28, anion gap 15, glucose 123    BUN 28, creatinine 1.34, GFR 53, calcium 8.5  LFTs are normal [EC]   1529 Protime-INR(!)  INR is 1.8.  The patient is on Eliquis [EC]   1529 Lactate(!)  Lactate is 3.7, I do not think the patient is septic.  He has history of CHF. [EC]   1529 CXR:  FINDINGS:  The lungs are clear.  No pneumothorax or effusion is evident. The  cardiomediastinal silhouette is  enlarged but similar to the prior  exam.There is a pulse generator on the left chest wall with leads  tracking over the heart.Degenerative change is seen of the spine and  shoulders.      IMPRESSION:  No acute cardiopulmonary process is evident.  Cardiomegally. [EC]   1736 CBC and Auto Differential(!)  CBC is normal [EC]   1737 Troponin I Series, High Sensitivity (0, 1 HR)(!!)  First troponin 63, second troponin 58 [EC]   1737 Lactate  Lactate 1.5 [EC]   1738 B-Type Natriuretic Peptide(!)   [EC]   1739 CT Head:  IMPRESSION:  No acute intracranial hemorrhage or large territory infarction is  evident.   [EC]   1740 Coronavirus is not  detected  Influenza A/B is not detected/not detected [EC]   2016 Patient was given aspirin 81 mg.  Lasix 40 mg IV was given.  For CHF  Rocephin 1 g IV was given for UTI    I discussed the findings with the patient and his wife.  I do feel that with his elevated troponins he does require admission.  They requested transfer to [facility] in [facility].    I did discuss the case with Dr. Shanda Aguiar who accepted the patient in transfer there.   [EC]   2134 Patient assessed, chart reviewed [MN]   Sun Oct 06, 2024   0846 Repeat EKG 10/6/2024 at 0818 interpreted by me at 0821.  Atrial paced rhythm.  Rate 64 bpm.  No acute ST-T change. [EC]   1024 White count this morning 8.9, hemoglobin 12.9, hematocrit 40.0, no significant shift [EC]   1025 Troponin I, High Sensitivity(!)  Repeat troponin trending down 44 [EC]   1025 Basic metabolic panel(!)  Potassium low 3.2 [EC]   1026 EMS was here to  the patient and take him to [facility] [EC]      ED Course User Index  [EC] Jose Venegas DO  [MN] Snow Castillo MD         Diagnoses as of 10/06/24 1027   Acute on chronic congestive heart failure, unspecified heart failure type   Acute cystitis without hematuria   Elevated troponin   Generalized weakness       Medical Decision Making  Patient was transferred via EMS to [facility] in [facility]        Final diagnoses:   [I50.9] Acute on chronic congestive heart failure, unspecified heart failure type   [N30.00] Acute cystitis without hematuria   [R79.89] Elevated troponin   [R53.1] Generalized weakness           Procedure  Procedures    Jose Venegas DO

## 2024-10-06 NOTE — PROGRESS NOTES
"Jonny Vicente is a 83 y.o. male on day 0 of admission presenting with shortness of breath, orthopnea, cough. Patient was initially evaluated by Theo, please see his note for full details of the H&P.  Patient was to take to have acute on chronic CHF and a UTI.  He was able to arrange for transfer to [facility] in [place].  Patient signed out to me at change of shift pending bed placement and transport.      Subjective   Patient denies and pain or dyspnea a this time. He states that dr Silverman promised him something to help him relax while waiting for transfer, and he would like to have that pil now       Objective     Physical Exam  Vitals reviewed.   Constitutional:       Appearance: He is obese.   HENT:      Head: Normocephalic and atraumatic.   Cardiovascular:      Rate and Rhythm: Normal rate and regular rhythm.   Pulmonary:      Effort: Pulmonary effort is normal.      Breath sounds: Decreased breath sounds present.   Skin:     General: Skin is warm and dry.   Neurological:      Mental Status: He is alert.         Last Recorded Vitals  Blood pressure 115/58, pulse 62, temperature 37.1 °C (98.7 °F), resp. rate 20, height 1.778 m (5' 10\"), weight (!) 157 kg (346 lb 2 oz), SpO2 95%.  Intake/Output last 3 Shifts:  No intake/output data recorded.    Relevant Results               Labs Reviewed   CBC WITH AUTO DIFFERENTIAL - Abnormal       Result Value    WBC 9.5      nRBC 0.0      RBC 4.58      Hemoglobin 14.2      Hematocrit 44.3      MCV 97      MCH 31.0      MCHC 32.1      RDW 14.1      Platelets 182      Neutrophils % 72.4      Immature Granulocytes %, Automated 0.2      Lymphocytes % 15.5      Monocytes % 11.0      Eosinophils % 0.5      Basophils % 0.4      Neutrophils Absolute 6.88 (*)     Immature Granulocytes Absolute, Automated 0.02      Lymphocytes Absolute 1.48      Monocytes Absolute 1.05 (*)     Eosinophils Absolute 0.05      Basophils Absolute 0.04     COMPREHENSIVE METABOLIC PANEL - " Abnormal    Glucose 123 (*)     Sodium 141      Potassium 4.0      Chloride 102      Bicarbonate 28      Anion Gap 15      Urea Nitrogen 28 (*)     Creatinine 1.34 (*)     eGFR 53 (*)     Calcium 8.5 (*)     Albumin 3.6      Alkaline Phosphatase 55      Total Protein 6.7      AST 17      Bilirubin, Total 0.8      ALT 20     PROTIME-INR - Abnormal    Protime 19.9 (*)     INR 1.8 (*)    B-TYPE NATRIURETIC PEPTIDE - Abnormal     (*)     Narrative:        <100 pg/mL - Heart failure unlikely  100-299 pg/mL - Intermediate probability of acute heart                  failure exacerbation. Correlate with clinical                  context and patient history.    >=300 pg/mL - Heart Failure likely. Correlate with clinical                  context and patient history.    BNP testing is performed using different testing methodology at [facility] than at other [facility]. Direct result comparisons should only be made within the same method.      LACTATE - Abnormal    Lactate 3.7 (*)     Narrative:     Venipuncture immediately after or during the administration of Metamizole may lead to falsely low results. Testing should be performed immediately prior to Metamizole dosing.   SERIAL TROPONIN-INITIAL - Abnormal    Troponin I, High Sensitivity 63 (*)     Narrative:     Less than 99th percentile of normal range cutoff-  Female and children under 18 years old <14 ng/L; Male <21 ng/L: Negative  Repeat testing should be performed if clinically indicated.     Female and children under 18 years old 14-50 ng/L; Male 21-50 ng/L:  Consistent with possible cardiac damage and possible increased clinical   risk. Serial measurements may help to assess extent of myocardial damage.     >50 ng/L: Consistent with cardiac damage, increased clinical risk and  myocardial infarction. Serial measurements may help assess extent of   myocardial damage.      NOTE: Children less than 1 year old may have higher baseline troponin    levels and results should be interpreted in conjunction with the overall   clinical context.     NOTE: Troponin I testing is performed using a different   testing methodology at [facility] than at other   system [facility]. Direct result comparisons should only   be made within the same method.   SERIAL TROPONIN, 1 HOUR - Abnormal    Troponin I, High Sensitivity 58 (*)     Narrative:     Less than 99th percentile of normal range cutoff-  Female and children under 18 years old <14 ng/L; Male <21 ng/L: Negative  Repeat testing should be performed if clinically indicated.     Female and children under 18 years old 14-50 ng/L; Male 21-50 ng/L:  Consistent with possible cardiac damage and possible increased clinical   risk. Serial measurements may help to assess extent of myocardial damage.     >50 ng/L: Consistent with cardiac damage, increased clinical risk and  myocardial infarction. Serial measurements may help assess extent of   myocardial damage.      NOTE: Children less than 1 year old may have higher baseline troponin   levels and results should be interpreted in conjunction with the overall   clinical context.     NOTE: Troponin I testing is performed using a different   testing methodology at [facility] than at other   [facility]. Direct result comparisons should only   be made within the same method.   URINALYSIS WITH REFLEX CULTURE AND MICROSCOPIC - Abnormal    Color, Urine Isidra (*)     Appearance, Urine Hazy (*)     Specific Gravity, Urine 1.017      pH, Urine 5.0      Protein, Urine 30 (1+) (*)     Glucose, Urine >=500 (3+) (*)     Blood, Urine LARGE (3+) (*)     Ketones, Urine NEGATIVE      Bilirubin, Urine NEGATIVE      Urobilinogen, Urine 4.0 (*)     Nitrite, Urine NEGATIVE      Leukocyte Esterase, Urine SMALL (1+) (*)    MICROSCOPIC ONLY, URINE - Abnormal    WBC, Urine 11-20 (*)     RBC, Urine >20 (*)     Squamous Epithelial Cells, Urine 1-9 (SPARSE)     MAGNESIUM -  Normal    Magnesium 1.81     SARS-COV-2 PCR - Normal    Coronavirus 2019, PCR Not Detected      Narrative:     This assay has received FDA Emergency Use Authorization (EUA) and is only authorized for the duration of time that circumstances exist to justify the authorization of the emergency use of in vitro diagnostic tests for the detection of SARS-CoV-2 virus and/or diagnosis of COVID-19 infection under section 564(b)(1) of the Act, 21 U.S.C. 360bbb-3(b)(1). This assay is an in vitro diagnostic nucleic acid amplification test for the qualitative detection of SARS-CoV-2 from nasopharyngeal specimens and has been validated for use at [facility]. Negative results do not preclude COVID-19 infections and should not be used as the sole basis for diagnosis, treatment, or other management decisions.     INFLUENZA A AND B PCR - Normal    Flu A Result Not Detected      Flu B Result Not Detected      Narrative:     This assay is an in vitro diagnostic multiplex nucleic acid amplification test for the detection and discrimination of Influenza A & B from nasopharyngeal specimens, and has been validated for use at [facility]. Negative results do not preclude Influenza A/B infections, and should not be used as the sole basis for diagnosis, treatment, or other management decisions. If Influenza A/B and RSV PCR results are negative, testing for Parainfluenza virus, Adenovirus and Metapneumovirus is routinely performed for [facility] pediatric oncology and intensive care inpatients, and is available on other patients by placing an add-on request.   LACTATE - Normal    Lactate 1.5      Narrative:     Venipuncture immediately after or during the administration of Metamizole may lead to falsely low results. Testing should be performed immediately prior to Metamizole dosing.   URINE CULTURE   TROPONIN SERIES- (INITIAL, 1 HR)    Narrative:     The following orders were created for panel order  Troponin I Series, High Sensitivity (0, 1 HR).  Procedure                               Abnormality         Status                     ---------                               -----------         ------                     Troponin I, High Sensiti...[[id number]]  Abnormal            Final result               Troponin, High Sensitivi...[[id number]]  Abnormal            Final result                 Please view results for these tests on the individual orders.   URINALYSIS WITH REFLEX CULTURE AND MICROSCOPIC    Narrative:     The following orders were created for panel order Urinalysis with Reflex Culture and Microscopic.  Procedure                               Abnormality         Status                     ---------                               -----------         ------                     Urinalysis with Reflex C...[[id number]]  Abnormal            Final result               Extra Urine Gray Tube[[id number]]                            In process                   Please view results for these tests on the individual orders.   EXTRA URINE GRAY TUBE     CT head wo IV contrast   Final Result   No acute intracranial hemorrhage or large territory infarction is   evident.        Signed by: Bill Feliciano 10/5/2024 3:54 PM   Dictation workstation:   [id number]      XR chest 1 view   Final Result   No acute cardiopulmonary process is evident.        MACRO:   None        Signed by: Bill Feliciano 10/5/2024 3:00 PM   Dictation workstation:   [id number]        ED Medication Administration from 10/05/2024 1431 to 10/05/2024 2142         Date/Time Order Dose Route Action Action by     10/05/2024 1812 EDT furosemide (Lasix) injection 40 mg 40 mg intravenous Given PATRICE Plummer     10/05/2024 1904 EDT cefTRIAXone (Rocephin) 2 g in dextrose (iso) IV 50 mL 2 g intravenous New Bag PATRICE Plummer     10/05/2024 2033 EDT cefTRIAXone (Rocephin) 2 g in dextrose (iso) IV 50 mL 0 g intravenous MOHAN Yanes     10/05/2024 2036 EDT aspirin  chewable tablet 81 mg 81 mg oral Given MOHAN Bright                           Assessment/Plan   Assessment & Plan  Acute on chronic congestive heart failure, unspecified heart failure type    Acute cystitis without hematuria    Elevated troponin    Generalized weakness    Patient had presented to the emergency department with dyspnea on exertion, orthopnea nonproductive cough.  Diagnostic workup consistent with UTI and acute on chronic congestive heart failure.  Patient has been treated with IV Lasix, IV Rocephin for the UTI.  Patient's troponins were elevated without any ischemic EKG changes.  He is excepted for transfer to [facility].  Patient given oral Ativan for complaints of anxiety.  We will continue the patient under safe supervision in the emergency department pending bed assignment and transport.       I spent 0 minutes in the critical care of this patient.      Snow Castillo MD

## 2024-10-07 LAB
ATRIAL RATE: 51 BPM
ATRIAL RATE: 64 BPM
P AXIS: 9 DEGREES
P OFFSET: 187 MS
P ONSET: 117 MS
PR INTERVAL: 158 MS
PR INTERVAL: 170 MS
Q ONSET: 196 MS
Q ONSET: 199 MS
QRS COUNT: 10 BEATS
QRS COUNT: 10 BEATS
QRS DURATION: 130 MS
QRS DURATION: 150 MS
QT INTERVAL: 482 MS
QT INTERVAL: 504 MS
QTC CALCULATION(BAZETT): 497 MS
QTC CALCULATION(BAZETT): 519 MS
QTC FREDERICIA: 492 MS
QTC FREDERICIA: 514 MS
R AXIS: -80 DEGREES
R AXIS: 258 DEGREES
T AXIS: 74 DEGREES
T AXIS: 88 DEGREES
T OFFSET: 437 MS
T OFFSET: 451 MS
VENTRICULAR RATE: 64 BPM
VENTRICULAR RATE: 64 BPM

## 2024-10-09 LAB — BACTERIA UR CULT: ABNORMAL
